# Patient Record
Sex: FEMALE | Race: BLACK OR AFRICAN AMERICAN | Employment: FULL TIME | URBAN - METROPOLITAN AREA
[De-identification: names, ages, dates, MRNs, and addresses within clinical notes are randomized per-mention and may not be internally consistent; named-entity substitution may affect disease eponyms.]

---

## 2017-01-28 ENCOUNTER — HOSPITAL ENCOUNTER (EMERGENCY)
Facility: HOSPITAL | Age: 42
Discharge: HOME | End: 2017-01-28

## 2017-01-28 ENCOUNTER — APPOINTMENT (OUTPATIENT)
Dept: GENERAL RADIOLOGY | Facility: HOSPITAL | Age: 42
End: 2017-01-28
Attending: NURSE PRACTITIONER

## 2017-01-28 VITALS
OXYGEN SATURATION: 98 % | BODY MASS INDEX: 33.13 KG/M2 | HEART RATE: 77 BPM | DIASTOLIC BLOOD PRESSURE: 77 MMHG | TEMPERATURE: 98 F | HEIGHT: 63 IN | RESPIRATION RATE: 18 BRPM | WEIGHT: 187 LBS | SYSTOLIC BLOOD PRESSURE: 142 MMHG

## 2017-01-28 DIAGNOSIS — J20.9 ACUTE BRONCHITIS, UNSPECIFIED ORGANISM: Primary | ICD-10-CM

## 2017-01-28 LAB — S PYO AG THROAT QL: NEGATIVE

## 2017-01-28 PROCEDURE — 87430 STREP A AG IA: CPT

## 2017-01-28 PROCEDURE — 71020 XR CHEST PA + LAT CHEST (CPT=71020): CPT

## 2017-01-28 PROCEDURE — 94640 AIRWAY INHALATION TREATMENT: CPT

## 2017-01-28 PROCEDURE — 99283 EMERGENCY DEPT VISIT LOW MDM: CPT

## 2017-01-28 RX ORDER — IPRATROPIUM BROMIDE AND ALBUTEROL SULFATE 2.5; .5 MG/3ML; MG/3ML
3 SOLUTION RESPIRATORY (INHALATION) ONCE
Status: COMPLETED | OUTPATIENT
Start: 2017-01-28 | End: 2017-01-28

## 2017-01-28 RX ORDER — FLUTICASONE PROPIONATE 50 MCG
1 SPRAY, SUSPENSION (ML) NASAL DAILY
COMMUNITY

## 2017-01-28 RX ORDER — ALBUTEROL SULFATE 90 UG/1
2 AEROSOL, METERED RESPIRATORY (INHALATION) EVERY 4 HOURS PRN
Qty: 1 INHALER | Refills: 0 | Status: SHIPPED | OUTPATIENT
Start: 2017-01-28 | End: 2017-02-27

## 2017-01-28 RX ORDER — CODEINE PHOSPHATE AND GUAIFENESIN 10; 100 MG/5ML; MG/5ML
10 SOLUTION ORAL EVERY 6 HOURS PRN
Qty: 200 ML | Refills: 0 | Status: SHIPPED | OUTPATIENT
Start: 2017-01-28

## 2017-01-28 NOTE — ED PROVIDER NOTES
Patient Seen in: [facility] Emergency Department    History   Patient presents with:  Cough    Stated Complaint:     HPI    Patient presents into the emergency room for evaluation of cough.   Patient states the onset of the symptoms began 2 days ag negative except as noted above. PSFH elements reviewed from today and agreed except as otherwise stated in HPI.     Physical Exam       ED Triage Vitals   BP 01/28/17 1207 140/86 mmHg   Pulse 01/28/17 1207 95   Resp 01/28/17 1207 22   Temp 01/28/17 1207 [address] [facility]    Schedule an appointment as soon as possible for a visit in 2 days        Medications Prescribed:  Current Discharge Medication List    START taking these medications    guaiFENesin-codeine (CHERATUSSIN AC) 100-10 MG/5ML Oral Solution

## 2017-09-18 ENCOUNTER — OFFICE VISIT (OUTPATIENT)
Dept: OTOLARYNGOLOGY | Facility: CLINIC | Age: 42
End: 2017-09-18

## 2017-09-18 VITALS
SYSTOLIC BLOOD PRESSURE: 138 MMHG | TEMPERATURE: 98 F | HEIGHT: 64 IN | BODY MASS INDEX: 32.44 KG/M2 | WEIGHT: 190 LBS | DIASTOLIC BLOOD PRESSURE: 84 MMHG

## 2017-09-18 DIAGNOSIS — J32.9 CHRONIC SINUSITIS, UNSPECIFIED LOCATION: Primary | ICD-10-CM

## 2017-09-18 PROCEDURE — 99203 OFFICE O/P NEW LOW 30 MIN: CPT | Performed by: OTOLARYNGOLOGY

## 2017-09-18 PROCEDURE — 99212 OFFICE O/P EST SF 10 MIN: CPT | Performed by: OTOLARYNGOLOGY

## 2017-09-18 RX ORDER — AZELASTINE 1 MG/ML
2 SPRAY, METERED NASAL 2 TIMES DAILY
Qty: 1 BOTTLE | Refills: 0 | Status: SHIPPED | OUTPATIENT
Start: 2017-09-18

## 2017-09-18 RX ORDER — MONTELUKAST SODIUM 10 MG/1
10 TABLET ORAL NIGHTLY
Qty: 30 TABLET | Refills: 3 | Status: SHIPPED | OUTPATIENT
Start: 2017-09-18

## 2017-09-19 NOTE — PROGRESS NOTES
Yana Barnett is a 43year old female.   Patient presents with:  Sinus Problem: recurrent sinus infection 5x in a year  Ear Problem: recurrent ear infection 3 x a year      HISTORY OF PRESENT ILLNESS  She presents with a very long history of chronic EXAM    /84   Temp 98.4 °F (36.9 °C) (Tympanic)   Ht 5' 4\" (1.626 m)   Wt 190 lb (86.2 kg)   BMI 32.61 kg/m²        Constitutional Normal Overall appearance overweight   Psychiatric Normal Orientation - Oriented to time, place, person & situation.  A by mouth every 6 (six) hours as needed for cough. , Disp: 200 mL, Rfl: 0  ASSESSMENT AND PLAN    1. Chronic sinusitis, unspecified location  Very congested nasal mucosa. Start Claritin-D, Singulair and Astelin nasal spray.   Continue fluticasone if possible

## 2019-05-11 ENCOUNTER — HOSPITAL ENCOUNTER (EMERGENCY)
Facility: HOSPITAL | Age: 44
Discharge: HOME | End: 2019-05-12
Attending: EMERGENCY MEDICINE

## 2019-05-11 DIAGNOSIS — N20.0 NEPHROLITHIASIS: Primary | ICD-10-CM

## 2019-05-11 PROCEDURE — 83735 ASSAY OF MAGNESIUM: CPT | Performed by: EMERGENCY MEDICINE

## 2019-05-11 PROCEDURE — 84703 CHORIONIC GONADOTROPIN ASSAY: CPT | Performed by: EMERGENCY MEDICINE

## 2019-05-11 PROCEDURE — 83690 ASSAY OF LIPASE: CPT | Performed by: EMERGENCY MEDICINE

## 2019-05-11 PROCEDURE — 80048 BASIC METABOLIC PNL TOTAL CA: CPT | Performed by: EMERGENCY MEDICINE

## 2019-05-11 PROCEDURE — 85025 COMPLETE CBC W/AUTO DIFF WBC: CPT | Performed by: EMERGENCY MEDICINE

## 2019-05-11 PROCEDURE — 96372 THER/PROPH/DIAG INJ SC/IM: CPT

## 2019-05-11 PROCEDURE — 96361 HYDRATE IV INFUSION ADD-ON: CPT

## 2019-05-11 PROCEDURE — 96374 THER/PROPH/DIAG INJ IV PUSH: CPT

## 2019-05-11 PROCEDURE — 96375 TX/PRO/DX INJ NEW DRUG ADDON: CPT

## 2019-05-11 PROCEDURE — 80076 HEPATIC FUNCTION PANEL: CPT | Performed by: EMERGENCY MEDICINE

## 2019-05-11 PROCEDURE — 99284 EMERGENCY DEPT VISIT MOD MDM: CPT

## 2019-05-11 RX ORDER — METOCLOPRAMIDE HYDROCHLORIDE 5 MG/ML
10 INJECTION INTRAMUSCULAR; INTRAVENOUS ONCE
Status: COMPLETED | OUTPATIENT
Start: 2019-05-11 | End: 2019-05-11

## 2019-05-11 RX ORDER — DICYCLOMINE HYDROCHLORIDE 10 MG/ML
20 INJECTION INTRAMUSCULAR ONCE
Status: COMPLETED | OUTPATIENT
Start: 2019-05-11 | End: 2019-05-11

## 2019-05-11 RX ORDER — DIPHENHYDRAMINE HYDROCHLORIDE 50 MG/ML
25 INJECTION INTRAMUSCULAR; INTRAVENOUS ONCE
Status: COMPLETED | OUTPATIENT
Start: 2019-05-11 | End: 2019-05-11

## 2019-05-12 ENCOUNTER — APPOINTMENT (OUTPATIENT)
Dept: CT IMAGING | Facility: HOSPITAL | Age: 44
End: 2019-05-12
Attending: EMERGENCY MEDICINE

## 2019-05-12 VITALS
OXYGEN SATURATION: 99 % | SYSTOLIC BLOOD PRESSURE: 145 MMHG | TEMPERATURE: 99 F | HEART RATE: 98 BPM | RESPIRATION RATE: 18 BRPM | WEIGHT: 200 LBS | DIASTOLIC BLOOD PRESSURE: 86 MMHG | HEIGHT: 63 IN | BODY MASS INDEX: 35.44 KG/M2

## 2019-05-12 PROCEDURE — 74177 CT ABD & PELVIS W/CONTRAST: CPT | Performed by: EMERGENCY MEDICINE

## 2019-05-12 PROCEDURE — 80307 DRUG TEST PRSMV CHEM ANLYZR: CPT | Performed by: EMERGENCY MEDICINE

## 2019-05-12 PROCEDURE — 81001 URINALYSIS AUTO W/SCOPE: CPT | Performed by: EMERGENCY MEDICINE

## 2019-05-12 RX ORDER — ONDANSETRON 2 MG/ML
4 INJECTION INTRAMUSCULAR; INTRAVENOUS ONCE
Status: COMPLETED | OUTPATIENT
Start: 2019-05-12 | End: 2019-05-12

## 2019-05-12 RX ORDER — ONDANSETRON 4 MG/1
4 TABLET, ORALLY DISINTEGRATING ORAL EVERY 8 HOURS PRN
Qty: 15 TABLET | Refills: 0 | Status: SHIPPED | OUTPATIENT
Start: 2019-05-12 | End: 2019-05-17

## 2019-05-12 RX ORDER — TAMSULOSIN HYDROCHLORIDE 0.4 MG/1
0.4 CAPSULE ORAL DAILY
Qty: 14 CAPSULE | Refills: 0 | Status: SHIPPED | OUTPATIENT
Start: 2019-05-12 | End: 2019-05-26

## 2019-05-12 RX ORDER — ALFUZOSIN HYDROCHLORIDE 10 MG/1
10 TABLET, EXTENDED RELEASE ORAL ONCE
Status: COMPLETED | OUTPATIENT
Start: 2019-05-12 | End: 2019-05-12

## 2019-05-12 RX ORDER — TRAMADOL HYDROCHLORIDE 50 MG/1
50 TABLET ORAL EVERY 8 HOURS PRN
Qty: 15 TABLET | Refills: 0 | Status: SHIPPED | OUTPATIENT
Start: 2019-05-12 | End: 2019-05-17

## 2019-05-12 RX ORDER — KETOROLAC TROMETHAMINE 30 MG/ML
30 INJECTION, SOLUTION INTRAMUSCULAR; INTRAVENOUS ONCE
Status: COMPLETED | OUTPATIENT
Start: 2019-05-12 | End: 2019-05-12

## 2019-05-12 RX ORDER — MELOXICAM 7.5 MG/1
7.5 TABLET ORAL DAILY
Qty: 14 TABLET | Refills: 0 | Status: SHIPPED | OUTPATIENT
Start: 2019-05-12 | End: 2019-05-26

## 2019-05-12 RX ORDER — POTASSIUM CHLORIDE 20 MEQ/1
40 TABLET, EXTENDED RELEASE ORAL ONCE
Status: COMPLETED | OUTPATIENT
Start: 2019-05-12 | End: 2019-05-12

## 2019-05-12 NOTE — ED PROVIDER NOTES
Patient Seen in: [facility] AND [facility] Emergency Department    History   Patient presents with:  Abdomen/Flank Pain (GI/)    Stated Complaint:  abd pain    HPI    39 yo F with PMH fibromyalgia presenting for evaluation of diffuse right sided abdominal pain a Smokeless tobacco: Never Used    Alcohol use: Yes      Comment: socially    Drug use: No      Review of Systems :  Constitutional: As per HPI  Gastrointestinal: (+) abd pain, (+) nausea/vomiting/diarrhea. Genitourinary: Negative for dysuria and hematuria. Status                     ---------                               -----------         ------                     CBC W/ DIFFERENTIAL[[id number]]          Abnormal            Final result                 Please view results for these tests on the individual destroy any copies or printouts. MDM     DIFFERENTIAL DIAGNOSIS: After history and physical exam differential diagnosis includes but is not limited to appendicitis, biliary colic/cholecystitis, gastroenteritis, nephrolithiasis.     Pulse ox: 99%:Normal o pain. Use caution when taking this medication, it may make you drowsy/dizzy. ).   Qty: 15 tablet Refills: 0

## 2022-05-14 ENCOUNTER — HOSPITAL ENCOUNTER (EMERGENCY)
Facility: HOSPITAL | Age: 47
Discharge: HOME | End: 2022-05-14
Payer: COMMERCIAL

## 2022-05-14 VITALS
HEART RATE: 81 BPM | BODY MASS INDEX: 35 KG/M2 | WEIGHT: 199.94 LBS | SYSTOLIC BLOOD PRESSURE: 175 MMHG | TEMPERATURE: 98 F | DIASTOLIC BLOOD PRESSURE: 100 MMHG | RESPIRATION RATE: 16 BRPM | OXYGEN SATURATION: 99 %

## 2022-05-14 DIAGNOSIS — T78.40XA ALLERGIC REACTION, INITIAL ENCOUNTER: Primary | ICD-10-CM

## 2022-05-14 PROCEDURE — 96361 HYDRATE IV INFUSION ADD-ON: CPT

## 2022-05-14 PROCEDURE — S0028 INJECTION, FAMOTIDINE, 20 MG: HCPCS | Performed by: NURSE PRACTITIONER

## 2022-05-14 PROCEDURE — 96374 THER/PROPH/DIAG INJ IV PUSH: CPT

## 2022-05-14 PROCEDURE — 99284 EMERGENCY DEPT VISIT MOD MDM: CPT

## 2022-05-14 PROCEDURE — 96375 TX/PRO/DX INJ NEW DRUG ADDON: CPT

## 2022-05-14 RX ORDER — DIPHENHYDRAMINE HCL 25 MG
25 CAPSULE ORAL EVERY 6 HOURS PRN
Qty: 10 CAPSULE | Refills: 0 | Status: SHIPPED | OUTPATIENT
Start: 2022-05-14 | End: 2022-05-17

## 2022-05-14 RX ORDER — EPINEPHRINE 0.3 MG/.3ML
0.3 INJECTION SUBCUTANEOUS
Qty: 1 EACH | Refills: 0 | Status: SHIPPED | OUTPATIENT
Start: 2022-05-14 | End: 2022-06-13

## 2022-05-14 RX ORDER — FAMOTIDINE 10 MG/ML
20 INJECTION, SOLUTION INTRAVENOUS ONCE
Status: COMPLETED | OUTPATIENT
Start: 2022-05-14 | End: 2022-05-14

## 2022-05-14 RX ORDER — FAMOTIDINE 20 MG/1
20 TABLET, FILM COATED ORAL 2 TIMES DAILY
Qty: 6 TABLET | Refills: 0 | Status: SHIPPED | OUTPATIENT
Start: 2022-05-14 | End: 2022-05-17

## 2022-05-14 RX ORDER — METHYLPREDNISOLONE SODIUM SUCCINATE 125 MG/2ML
125 INJECTION, POWDER, LYOPHILIZED, FOR SOLUTION INTRAMUSCULAR; INTRAVENOUS ONCE
Status: COMPLETED | OUTPATIENT
Start: 2022-05-14 | End: 2022-05-14

## 2022-05-14 RX ORDER — DIPHENHYDRAMINE HYDROCHLORIDE 50 MG/ML
25 INJECTION INTRAMUSCULAR; INTRAVENOUS ONCE
Status: COMPLETED | OUTPATIENT
Start: 2022-05-14 | End: 2022-05-14

## 2022-05-14 RX ORDER — PREDNISONE 20 MG/1
40 TABLET ORAL DAILY
Qty: 10 TABLET | Refills: 0 | Status: SHIPPED | OUTPATIENT
Start: 2022-05-14 | End: 2022-05-19

## 2022-05-14 NOTE — ED INITIAL ASSESSMENT (HPI)
The patient arrived reporting that she was stung by an insect 30 minutes prior to arrival with notable swelling to the right eyelid. She denies any allergies.

## 2022-05-14 NOTE — ED QUICK NOTES
Patient states that swelling and pain to right eye has decreased. Patient states her vision is no longer blurry and she is now able to open her right eye.

## 2022-12-25 ENCOUNTER — HOSPITAL ENCOUNTER (EMERGENCY)
Facility: HOSPITAL | Age: 47
Discharge: HOME | End: 2022-12-25
Attending: EMERGENCY MEDICINE
Payer: COMMERCIAL

## 2022-12-25 ENCOUNTER — APPOINTMENT (OUTPATIENT)
Dept: GENERAL RADIOLOGY | Facility: HOSPITAL | Age: 47
End: 2022-12-25
Attending: EMERGENCY MEDICINE
Payer: COMMERCIAL

## 2022-12-25 VITALS
RESPIRATION RATE: 18 BRPM | SYSTOLIC BLOOD PRESSURE: 148 MMHG | OXYGEN SATURATION: 96 % | WEIGHT: 214 LBS | HEIGHT: 63 IN | BODY MASS INDEX: 37.92 KG/M2 | DIASTOLIC BLOOD PRESSURE: 84 MMHG | HEART RATE: 82 BPM | TEMPERATURE: 99 F

## 2022-12-25 DIAGNOSIS — M79.5 RESIDUAL FOREIGN BODY IN SOFT TISSUE: Primary | ICD-10-CM

## 2022-12-25 PROCEDURE — 73140 X-RAY EXAM OF FINGER(S): CPT | Performed by: EMERGENCY MEDICINE

## 2022-12-25 PROCEDURE — 10120 INC&RMVL FB SUBQ TISS SMPL: CPT

## 2022-12-25 PROCEDURE — 99284 EMERGENCY DEPT VISIT MOD MDM: CPT

## 2022-12-25 PROCEDURE — 90471 IMMUNIZATION ADMIN: CPT

## 2022-12-25 RX ORDER — LIDOCAINE HYDROCHLORIDE 10 MG/ML
20 INJECTION, SOLUTION EPIDURAL; INFILTRATION; INTRACAUDAL; PERINEURAL ONCE
Status: COMPLETED | OUTPATIENT
Start: 2022-12-25 | End: 2022-12-25

## 2022-12-25 RX ORDER — CEPHALEXIN 500 MG/1
500 CAPSULE ORAL 3 TIMES DAILY
Qty: 21 CAPSULE | Refills: 0 | Status: SHIPPED | OUTPATIENT
Start: 2022-12-25 | End: 2023-01-01

## 2022-12-25 NOTE — DISCHARGE INSTRUCTIONS
Follow with hand specialist as an outpatient. Return to emergency room for any fevers of 100.4, redness around laceration site, pus from laceration site. The Emergency Department is not intended to be a substitute for an effort to provide complete medical care. The imaging, if any, have often been interpreted on a preliminary basis pending final reading by the radiologist. If your blood pressure was greater than 140/90, please have this blood pressure rechecked by your primary care provider in the next several days.

## 2022-12-25 NOTE — ED QUICK NOTES
Pt dcd to home aox3, ambulatory, discharge instruction given and voices understanding, prescription sent to preferred pharmacy , denies any concern

## 2022-12-25 NOTE — ED INITIAL ASSESSMENT (HPI)
Pt was using embroidery machine today and needle stabbed through right index finger. Needle is still int pt's finger.

## 2025-05-12 ENCOUNTER — HOSPITAL ENCOUNTER (OUTPATIENT)
Dept: MAMMOGRAPHY | Age: 50
End: 2025-05-12
Payer: MEDICAID

## 2025-05-12 ENCOUNTER — HOSPITAL ENCOUNTER (OUTPATIENT)
Dept: MAMMOGRAPHY | Age: 50
Discharge: HOME | End: 2025-05-12
Payer: MEDICAID

## 2025-05-12 DIAGNOSIS — Z12.31 ENCOUNTER FOR SCREENING MAMMOGRAM FOR MALIGNANT NEOPLASM OF BREAST: ICD-10-CM

## 2025-05-12 PROCEDURE — 77067 SCR MAMMO BI INCL CAD: CPT

## 2025-05-12 PROCEDURE — 77063 BREAST TOMOSYNTHESIS BI: CPT

## 2025-05-15 ENCOUNTER — OFFICE VISIT (OUTPATIENT)
Age: 50
End: 2025-05-15
Payer: MEDICAID

## 2025-05-15 ENCOUNTER — HOSPITAL ENCOUNTER (OUTPATIENT)
Dept: GENERAL RADIOLOGY | Facility: HOSPITAL | Age: 50
Discharge: HOME | End: 2025-05-15
Attending: STUDENT IN AN ORGANIZED HEALTH CARE EDUCATION/TRAINING PROGRAM
Payer: MEDICAID

## 2025-05-15 VITALS — DIASTOLIC BLOOD PRESSURE: 81 MMHG | HEART RATE: 89 BPM | SYSTOLIC BLOOD PRESSURE: 132 MMHG

## 2025-05-15 DIAGNOSIS — R52 PAIN: ICD-10-CM

## 2025-05-15 DIAGNOSIS — R52 PAIN: Primary | ICD-10-CM

## 2025-05-15 DIAGNOSIS — M75.111 NONTRAUMATIC INCOMPLETE TEAR OF RIGHT ROTATOR CUFF: ICD-10-CM

## 2025-05-15 PROCEDURE — 20610 DRAIN/INJ JOINT/BURSA W/O US: CPT | Performed by: STUDENT IN AN ORGANIZED HEALTH CARE EDUCATION/TRAINING PROGRAM

## 2025-05-15 PROCEDURE — 99204 OFFICE O/P NEW MOD 45 MIN: CPT | Performed by: STUDENT IN AN ORGANIZED HEALTH CARE EDUCATION/TRAINING PROGRAM

## 2025-05-15 PROCEDURE — 73030 X-RAY EXAM OF SHOULDER: CPT | Performed by: STUDENT IN AN ORGANIZED HEALTH CARE EDUCATION/TRAINING PROGRAM

## 2025-05-15 RX ORDER — KETOROLAC TROMETHAMINE 30 MG/ML
30 INJECTION, SOLUTION INTRAMUSCULAR; INTRAVENOUS ONCE
Status: COMPLETED | OUTPATIENT
Start: 2025-05-15 | End: 2025-05-15

## 2025-05-15 RX ORDER — METHYLPREDNISOLONE ACETATE 40 MG/ML
40 INJECTION, SUSPENSION INTRA-ARTICULAR; INTRALESIONAL; INTRAMUSCULAR; SOFT TISSUE ONCE
Status: COMPLETED | OUTPATIENT
Start: 2025-05-15 | End: 2025-05-15

## 2025-05-15 RX ADMIN — KETOROLAC TROMETHAMINE 30 MG: 30 INJECTION, SOLUTION INTRAMUSCULAR; INTRAVENOUS at 17:37:00

## 2025-05-15 RX ADMIN — METHYLPREDNISOLONE ACETATE 40 MG: 40 INJECTION, SUSPENSION INTRA-ARTICULAR; INTRALESIONAL; INTRAMUSCULAR; SOFT TISSUE at 17:38:00

## 2025-05-15 NOTE — PROGRESS NOTES
Per  request was draw 1 syringe with 2 ml Lidocaine 2% ; 2 ml Marcaine 0.5% ; 1 ml Toradol ( Ketorolac) 60 mg/2 ml , and 1 ml Depo-Medrol 40 mg for right  shoulder. Rupal HESTER MA

## 2025-05-19 NOTE — PROGRESS NOTES
[facility] - ORTHOPEDICS  [address] [facility] [address]  [phone]     NURSING INTAKE COMMENTS:   Chief Complaint   Patient presents with    Shoulder Pain     Right shoulder - Pt pain for a year, has been getting worse. Constant pain. Burning, numbness and tinging down arm No injury.             The following individual(s) verbally consented to be recorded using ambient AI listening technology and understand that they can each withdraw their consent to this listening technology at any point by asking the clinician to turn off or pause the recording:    Patient name: Herson Lewis        HPI:   History of Present Illness  Herson Ingram is a 50 year old female with fibromyalgia who presents with right shoulder pain.    She has been experiencing right shoulder pain for over two years, with a notable increase in frequency and intensity since the fall of last year. The pain is described as 'hot' and burning, with sensations of stretching and sharpness. It radiates from the shoulder down the arm, and she notes that it feels as if something is 'trying to rip apart' during certain movements.    Her history of fibromyalgia led her to initially attribute the shoulder pain to this condition, causing a delay in seeking treatment. She has been purchasing items to manage the shoulder pain for over two years, indicating a long-standing issue.    The pain has significantly impacted her daily life, as she is unable to perform activities after a recent doctor's appointment due to exacerbation of the pain. She also reports compensatory pain in her left wrist and side due to overuse from avoiding the right shoulder.    During the review of symptoms, the pain is exacerbated by certain movements and is accompanied by weakness in the shoulder. No pain is experienced when keeping the left arm up, but sharp pain occurs when keeping the right arm up.        Past Medical History[1]  Past Surgical History[2]  Current  Medications[3]  Allergies[4]  Family History[5]    Social History     Occupational History    Not on file   Tobacco Use    Smoking status: Never    Smokeless tobacco: Never   Vaping Use    Vaping status: Never Used   Substance and Sexual Activity    Alcohol use: Yes     Comment: socially    Drug use: No    Sexual activity: Not on file        Review of Systems:  GENERAL: denies fevers, chills, night sweats, fatigue, unintentional weight loss/gain  SKIN: denies skin lesions, open sores, rash  HEENT:denies recent vision change, new nasal congestion,hearing loss, tinnitus, sore throat, headaches  RESPIRATORY: denies new shortness of breath, cough, asthma, wheezing  CARDIOVASCULAR: denies chest pain, leg cramps with exertion, palpitations, leg swelling  GI: denies abdominal pain, nausea, vomiting, diarrhea, constipation, hematochezia, worsening heartburn or stomach ulcers  : denies dysuria, hematuria, incontinence, increased frequency, urgency, difficulty urinating  MUSCULOSKELETAL: denies musculoskeletal complaints other than in HPI  NEURO: denies numbness, tingling, weakness, balance issues, dizziness, memory loss  PSYCHIATRIC: denies Hx of depression, anxiety, other psychiatric disorders  HEMATOLOGIC: denies blood clots, anemia, blood clotting disorders, blood transfusion  ENDOCRINE: denies autoimmune disease, thyroid issues, or diabetes  ALLERGY: denies asthma, seasonal allergies    Physical Examination:    /81   Pulse 89   LMP 05/06/2019     Physical Exam  MUSCULOSKELETAL: Right shoulder pain with stretching sensation, weakness on abduction, and sharp pain on resistance.    Constitutional: appears well hydrated, alert and responsive, no acute distress noted          Imaging:     Results  RADIOLOGY  Shoulder X-ray: Patient reported pain during imaging (05/14/2025)      Imaging was independently reviewed and interpreted by attending physician  FAMILIA RUBIO 2D+3D SCREENING BILAT (CPT=77067/93453)  Result Date:  5/13/2025  PROCEDURE: [facility] RAMIRO 2D+3D SCREENING BILAT (87472/57747)  COMPARISON: None.  INDICATIONS: Z12.31 Encounter for screening mammogram for malignant neoplasm of breast  TECHNIQUE:  Full field direct screening mammography was performed and images were reviewed with the depict RICA 1.5.1.5 CAD device.  3D tomosynthesis was performed and reviewed   BREAST COMPOSITION:   Category B - There are scattered areas of fibroglandular density.   FINDINGS:   Ovoid asymmetry in the central anterior subareolar left breast. There is no suspicious mass, architectural distortion, or calcifications identified in the right breast.           CONCLUSION:     Patient reports previous films at an outside institution.  If previous films are obtained, an addendum can be submitted. Otherwise, Additional diagnostic mammographic views with possible ultrasound recommended for left anterior asymmetry    BI-RADS CATEGORY:   DIAGNOSTIC CATEGORY 0 - INCOMPLETE: NEED PRIOR MAMMOGRAMS FOR COMPARISON   RECOMMENDATIONS:  COMPARISON TO PRIOR EXAMS REQUIRED. --An addendum report will be sent after prior exams are obtained and reviewed.       PLEASE NOTE: NORMAL MAMMOGRAM DOES NOT EXCLUDE THE POSSIBILITY OF BREAST CANCER.  A CLINICALLY SUSPICIOUS PALPABLE LUMP SHOULD BE BIOPSIED.   For patients over the age of 40, the target due date for the patient's next mammogram has been entered into a reminder system.   Patient received a discharge summary from the technologist after completion of exam.  Breast marker legend used on images  Triangle = Palpable lump Iliff = Skin tag or mole BB = Nipple Linear dorian = Scar Square = Pain    Dictated by (CST): Anabel Herrera MD on 5/13/2025 at 3:00 PM     Finalized by (CST): Anabel Herrera MD on 5/13/2025 at 3:02 PM      [address], [address], [place] [address] [phone]       Labs:  Lab Results   Component Value Date    WBC 16.6 (H) 05/11/2019    HGB 11.2 (L)  05/11/2019    .0 05/11/2019      Lab Results   Component Value Date     (H) 05/11/2019    BUN 16 05/11/2019    CREATSERUM 1.12 (H) 05/11/2019    GFRNAA 60 05/11/2019    GFRAA 69 05/11/2019        Assessment and Plan:  Assessment & Plan  Rotator cuff injury  Chronic right shoulder pain with suspected rotator cuff injury. Examination suggests weakness and pain consistent with rotator cuff injury. Differential includes full or partial tear. Informed consent obtained for cortisone injection. Two-thirds of patients with high-grade partial tears improve with physical therapy and injection.  - Administer low-dose cortisone steroid injection to right shoulder.  - Order MRI of right shoulder to assess extent of injury.  - Recommend physical therapy for shoulder function and pain management.  - Schedule follow-up in six weeks to assess treatment response and review MRI results.    Fibromyalgia  Chronic condition contributing to overall pain experience. High pain tolerance may have delayed treatment for shoulder pain.    Procedure Note Corticosteroid Injection  _______________________________________________________________________________________________________________  Patient Name: Herson eLwis   Date: 5/19/2025     Based on history, physical exam, and available diagnostic testing, the patient diagnosis appears consistent with intraarticular pathology. We discussed the use of a corticosteroid injection for therapeutic/confirmatory diagnostic purposes. The risks, benefits, and potential complications of corticosteroid injection(s) were discussed in detail. The risks include, but are not limited to: pain, infection, bleeding/hematoma, swelling, flare response, incomplete resolution of symptoms, possible need for further injections or subsequent surgical intervention, subcutaneous fat atrophy, skin pigmentation changes, and elevation of blood sugar. The patient verbalized an understanding and agrees  to the injection(s).     Under sterile prep, approximately:  1 mL DepoMedrol 40mg/ml, 2 mL marcaine plain, 2 mL lidocaine plain, 1mL Toradol 30mg/mL    was injected into: Right shoulder joint subacromial space    This procedure was performed without ultrasound guidance    This procedure was well tolerated. The patient understands that the injection could take several days to take effect.    The patient was not sedated and fully conscious for the injection.  Prior to the injection, verification followed the Universal Protocol in the following manner:  [X] A \"Time Out\" was performed prior to the procedure to confirm patient identification, injection site, and preparation of equipment.  [X] The patient was identified using two patient identifiers.  [X] The procedure site was appropriately prepped    Jasvir Benson MD  Orthopaedic Surgery  5/19/2025     Diagnoses and all orders for this visit:    Pain  -     XR SHOULDER, COMPLETE (MIN 2 VIEWS), RIGHT (CPT=73030); Future    Nontraumatic incomplete tear of right rotator cuff  -     MRI SHOULDER, RIGHT (CPT=73221); Future  -     Large joint - right aspiration/injection  -     ketorolac (Toradol) 60 MG/2ML IM injection 30 mg  -     methylPREDNISolone acetate (DEPO-medrol) 40 MG/ML injection 40 mg        Follow Up: No follow-ups on file.          Jasvir Benson MD Orthopedic Surgery / Sports Medicine Specialist  [facility]  [facility] [place] [place] [address]  [email or web address]    t: [phone] f: [phone]    This note was dictated using Dragon software.  While it was briefly proofread prior to completion, some grammatical, spelling, and word choice errors due to dictation may still occur.       [1]   Past Medical History:   Fibromyalgia   [2]   Past Surgical History:  Procedure Laterality Date    Knee replacement surgery  2016    Knee scope,full synovect Left 4/14/2016    Procedure: ARTHROSCOPY LEFT KNEE W/ MEDIAL MENISCECTOMY;  Surgeon:  Arsh Ugarte MD;  Location: [facility]    Knee scope,med/lat menisectomy Left 4/14/2016    Procedure: ARTHROSCOPY LEFT KNEE W/ MEDIAL MENISCECTOMY;  Surgeon: Arsh Ugarte MD;  Location: [facility]    Other Right     laparoscopic knee surgery   [3]   Current Outpatient Medications   Medication Sig Dispense Refill    Montelukast Sodium 10 MG Oral Tab Take 1 tablet (10 mg total) by mouth nightly. 30 tablet 3    Azelastine HCl 0.1 % Nasal Solution 2 sprays by Nasal route 2 (two) times daily. 1 Bottle 0    Loratadine-Pseudoephedrine ER 5-120 MG Oral Tablet 12 Hr Take 1 tablet by mouth every 12 (twelve) hours. 60 tablet 3    Fluticasone Propionate 50 MCG/ACT Nasal Suspension 1 spray by Each Nare route daily.      guaiFENesin-codeine (CHERATUSSIN AC) 100-10 MG/5ML Oral Solution Take 10 mL by mouth every 6 (six) hours as needed for cough. 200 mL 0    oxyCODONE-acetaminophen 5-325 MG Oral Tab Take 1-2 tablets by mouth every 4 (four) hours as needed for Pain (new 4/14/16).      aspirin 325 MG Oral Tab Take 325 mg by mouth daily.      Naproxen Sodium 220 MG Oral Cap Take 220 mg by mouth every 12 (twelve) hours as needed (advised for added pain relief as needed).      HYDROcodone-acetaminophen 5-325 MG Oral Tab Take 1 tablet by mouth every 6 (six) hours as needed for Pain.      Naproxen-Esomeprazole 500-20 MG Oral Tab EC Take by mouth.     [4] No Known Allergies  [5]   Family History  Problem Relation Age of Onset    Prostate Cancer Maternal Grandfather     Prostate Cancer Maternal Uncle

## 2025-06-09 ENCOUNTER — HOSPITAL ENCOUNTER (OUTPATIENT)
Dept: MRI IMAGING | Age: 50
Discharge: HOME | End: 2025-06-09
Attending: STUDENT IN AN ORGANIZED HEALTH CARE EDUCATION/TRAINING PROGRAM
Payer: MEDICAID

## 2025-06-09 DIAGNOSIS — M75.111 NONTRAUMATIC INCOMPLETE TEAR OF RIGHT ROTATOR CUFF: ICD-10-CM

## 2025-06-09 PROCEDURE — 73221 MRI JOINT UPR EXTREM W/O DYE: CPT | Performed by: STUDENT IN AN ORGANIZED HEALTH CARE EDUCATION/TRAINING PROGRAM

## 2025-06-30 ENCOUNTER — OFFICE VISIT (OUTPATIENT)
Dept: ORTHOPEDICS CLINIC | Facility: CLINIC | Age: 50
End: 2025-06-30
Payer: MEDICAID

## 2025-06-30 ENCOUNTER — OFFICE VISIT (OUTPATIENT)
Dept: PHYSICAL THERAPY | Age: 50
End: 2025-06-30
Attending: STUDENT IN AN ORGANIZED HEALTH CARE EDUCATION/TRAINING PROGRAM
Payer: MEDICAID

## 2025-06-30 DIAGNOSIS — M67.813 TENDINOSIS OF RIGHT ROTATOR CUFF: Primary | ICD-10-CM

## 2025-06-30 PROCEDURE — 97110 THERAPEUTIC EXERCISES: CPT | Performed by: PHYSICAL THERAPIST

## 2025-06-30 PROCEDURE — 97163 PT EVAL HIGH COMPLEX 45 MIN: CPT | Performed by: PHYSICAL THERAPIST

## 2025-06-30 PROCEDURE — 99214 OFFICE O/P EST MOD 30 MIN: CPT | Performed by: STUDENT IN AN ORGANIZED HEALTH CARE EDUCATION/TRAINING PROGRAM

## 2025-06-30 RX ORDER — AMLODIPINE BESYLATE 2.5 MG/1
TABLET ORAL
COMMUNITY
Start: 2024-09-30

## 2025-06-30 RX ORDER — CELECOXIB 200 MG/1
200 CAPSULE ORAL 2 TIMES DAILY
Qty: 60 CAPSULE | Refills: 0 | Status: SHIPPED | OUTPATIENT
Start: 2025-06-30

## 2025-06-30 NOTE — PROGRESS NOTES
UPPER EXTREMITY EVALUATION:     Diagnosis:   Tendinosis of right rotator cuff (M67.813) Patient:  Herson Lewis (50 year old, female)        Referring Provider: Jasvir Benson  Today's Date   6/30/2025    Precautions:  None   Date of Evaluation: 06/30/25  Next MD visit: No data recorded  Date of Surgery: NA     PATIENT SUMMARY     Summary of chief complaints: R shoulder pain  History of current condition: Has fibromyalgia so has body pain.  8 months ago R shoulder became more painful and frequent and continued to progress.  Had difficulty moving it in January.  No injury.  saw Dr. Roth 1 1/2 months ago.  Had tests done - U/S, MRI.  MD said no surgery.  Injection given 1 1/2 months ago and did help with the shoulder pain.   Pain level: current 4 /10, at best 4 /10, at worst 10 /10  Description of symptoms: burning front of shoulder some tingling.   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   Leisure activities/Hobbies: has minimized them due to pain.   Prior level of function: Prior to 8 months ago had a few flare ups and then went away, thought it was fibromylagia  Current limitations: lifting overhead, reaching behind, carrying anything > 5#, shoulder hasn't woke her up but wakes up due to fibromyalgia.  Pt goals: 1. To be more flexible and get strength back at the shoulder.  Hand Dominance: right   Past medical history was reviewed with Herson.  Significant findings include:    Imaging/Tests:   MRI  Herson  has a past medical history of Fibromyalgia.  She  has a past surgical history that includes knee scope,med/lat menisectomy (Left, 4/14/2016); knee scope,full synovect (Left, 4/14/2016); other (Right); and knee replacement surgery (2016).    ASSESSMENT  Herson presents to physical therapy evaluation with primary c/o R shoulder pain and limited ability to move the shoulder. The results of the objective tests and measures  show impaired ROM R to L shoulder, extreme sensitivity to light touch around shoulder, impaired strength R to L, tenderness bicep tendon region R to L/RC region R to L, (+) impingement and c/o pain greatly limiting ability to perform exericises/movements. . Functional deficits include but are not limited to lifting overhead, reaching behind, carrying anything > 5#, shoulder hasn't woke her up but wakes up due to fibromyalgia.. Signs and symptoms are consistent with diagnosis of Tendinosis of right rotator cuff (M67.813). Pt and PT discussed evaluation findings, pathology, POC and HEP.  Pt voiced understanding and performs HEP correctly without reported pain. Skilled Physical Therapy is medically necessary to address the above impairments and reach functional goals.    OBJECTIVE:      Musculoskeletal  Observation/Posture: forward head posture; scapula abduction; anterior pelvic tilt  Palpation: tenderness RC area, anterior shoulder/bicep tendon   Special Tests: (+) impingement     ROM and Strength (* denotes performed with pain)  Shoulder   ROM MMT (-/5)    R L R L     Flex 100 pain/ passive 135 pain 150 4 minus pain 5/5     Ext 25 45 4/5 5/5     Abd (C5) 120 pain end range, 128 passive with pain 170 4 minus pain 5/5     IR L1 with pain lower mid thoracic 4/5 5/5     ER 70 85 4/5 5/5     Low Trap n/a  NT  NT     Mid Trap n/a  4 minus  4/5     SA n/a                   Neurological:  Sensation: tingling in shoulder- comes and goes, sensation is normal, tingling in hands and feet off and on.      Today's Treatment and Response:   Pt education was provided on exam findings, treatment diagnosis, treatment plan, expectations, and prognosis.    Today's Treatment       6/30/2025   UE Treatment   Therapeutic Exercise Minutes 15   Evaluation Minutes 30   Total Time Of Timed Procedures 15   Total Time Of Service-Based Procedures 30   Total Treatment Time 45   HEP Table slides frwd/scaption  Scapular retractions   AROM ER/IR  movements  Rows YTB        Patient was instructed in and issued a HEP for: Table slides frwd/scaption  Scapular retractions   AROM ER/IR movements  Rows YTB- patient to try  was able to do 5 in department.    Charges:  PT EVAL: High Complexity,    In agreement with evaluation findings and clinical rationale, this evaluation involved HIGH COMPLEXITY decision making due to 3 or more personal factors/comorbidities, 4 or more body structures involved/activity limitations, and unstable symptoms as documented in the evaluation.                                                          PLAN OF CARE:      Goals: (to be met in 12 visits)   Patient to be within 5-10 deg of L shoulder with all movements with minimal pain.   Patient independent with ongoing HEP  Patient to report pain levels at 1-2/10 or less with movement of R UE  Patient to be able to reach into cabinets with minimal pain  Patient to be able to reach behind her for dressing with minimal pain     Frequency / Duration: Patient will be seen 2x/week or a total of 12  visits over a 90 day period. Treatment will include: Therapeutic Exercise; Home Exercise Program instruction; Manual Therapy, modalities as needed for pain relief.     Education or treatment limitation: None   Rehab Potential: good     QuickDASH Outcome Score  Score: (Patient-Rptd) 45.45 % (6/30/2025  5:00 PM)      Patient was advised of these findings, precautions, and treatment options and has agreed to actively participate in planning and for this course of care.    Thank you for your referral. Please co-sign or sign and return this letter via fax as soon as possible to [phone]. If you have any questions, please contact me at Dept: [phone]    Sincerely,  Electronically signed by therapist: Kary Naik, PT  Physician's certification required: Yes  I certify the need for these services furnished under this plan of treatment and while under my  care.    X___________________________________________________ Date____________________    Certification From: 6/30/2025  To: 9/28/2025           Post-Care Instructions: Patient instructed to not lie down for 4 hours and limit physical activity for 24 hours. Patient instructed not to travel by airplane for 48 hours.

## 2025-07-02 ENCOUNTER — OFFICE VISIT (OUTPATIENT)
Dept: PHYSICAL THERAPY | Age: 50
End: 2025-07-02
Attending: STUDENT IN AN ORGANIZED HEALTH CARE EDUCATION/TRAINING PROGRAM
Payer: MEDICAID

## 2025-07-02 PROCEDURE — 97110 THERAPEUTIC EXERCISES: CPT | Performed by: PHYSICAL THERAPIST

## 2025-07-02 PROCEDURE — 97014 ELECTRIC STIMULATION THERAPY: CPT | Performed by: PHYSICAL THERAPIST

## 2025-07-02 NOTE — PROGRESS NOTES
Patient: Herson Lewis (50 year old, female) Referring Provider:  Insurance:   Diagnosis: Tendinosis of right rotator cuff (M67.813) Jasvir [place] [employer]   Date of Surgery: NA Next MD visit:  N/A   Precautions:  None No data recorded Referral Information:    Date of Evaluation: Req: 10, Auth: 10, Exp: 8/29/2025 06/30/25 POC Auth Visits:  10       Today's Date   7/2/2025    Subjective  Patient reports able to do the exercises.       Pain: 4/10     Objective  see outpatient daily record.            Assessment  Patient continues to be very sensitive to touch and movements of R shoulder.  Was able to add 3 additional light exercises for home.  Used IFC and ice end of session.  Patient did report ice felt good as usually uses heat.  Patient to trial ice at home several times a day.    Goals (to be met in 12 visits)   Patient to be within 5-10 deg of L shoulder with all movements with minimal pain.   Patient independent with ongoing HEP  Patient to report pain levels at 1-2/10 or less with movement of R UE  Patient to be able to reach into cabinets with minimal pain  Patient to be able to reach behind her for dressing with minimal pain       Plan  Continue light motion to fascilitate better motion, advance as able.  Continue use of IFC and ice for pain relief.    Treatment Last 4 Visits  Treatment Day: 2       6/30/2025 7/2/2025   UE Treatment   Therapeutic Exercise  3 minutes nu step level 3  Supine - PROM flex/abd to just below 90, ER /IR  Side ER x 10  Prone - hang arm off edge of bed x 1 minute  Prone rows x 8  Scapular retractions x 10  Standing single arm row YTB x 10  IFC and ice x 15 minutes.      Therapeutic Exercise Minutes 15 30   Evaluation Minutes 30    E-Stim (Unattended) Minutes  15   Total Time Of Timed Procedures 15 30   Total Time Of Service-Based Procedures 30 15   Total Treatment Time 45 45   HEP Table slides frwd/scaption  Scapular retractions   AROM ER/IR  movements  Rows YTB         HEP  Table slides frwd/scaption  Scapular retractions   AROM ER/IR movements  Rows YTB single arm  Prone arm hang   Prone row  Side ER    Charges   Ex 2, IFC unattended

## 2025-07-08 ENCOUNTER — OFFICE VISIT (OUTPATIENT)
Dept: NEUROLOGY | Facility: CLINIC | Age: 50
End: 2025-07-08
Payer: MEDICAID

## 2025-07-08 VITALS — HEART RATE: 80 BPM | SYSTOLIC BLOOD PRESSURE: 131 MMHG | DIASTOLIC BLOOD PRESSURE: 85 MMHG | OXYGEN SATURATION: 99 %

## 2025-07-08 DIAGNOSIS — R20.2 PARESTHESIA OF BOTH HANDS: ICD-10-CM

## 2025-07-08 DIAGNOSIS — R20.2 PARESTHESIA OF BOTH LEGS: ICD-10-CM

## 2025-07-08 DIAGNOSIS — M79.7 FIBROMYALGIA: ICD-10-CM

## 2025-07-08 PROBLEM — R73.03 PREDIABETES: Status: ACTIVE | Noted: 2024-12-04

## 2025-07-08 PROBLEM — K21.00 GASTROESOPHAGEAL REFLUX DISEASE WITH ESOPHAGITIS: Status: ACTIVE | Noted: 2021-06-14

## 2025-07-08 PROBLEM — I10 HIGH BLOOD PRESSURE: Status: ACTIVE | Noted: 2025-07-08

## 2025-07-08 PROBLEM — J30.9 ALLERGIC RHINITIS: Status: ACTIVE | Noted: 2023-04-11

## 2025-07-08 PROBLEM — D50.9 IRON DEFICIENCY ANEMIA: Status: ACTIVE | Noted: 2021-06-14

## 2025-07-08 PROBLEM — B97.7 PAPILLOMAVIRUS AS THE CAUSE OF DISEASES CLASSIFIED ELSEWHERE: Status: ACTIVE | Noted: 2017-09-29

## 2025-07-08 PROBLEM — J32.9 CHRONIC SINUSITIS: Status: ACTIVE | Noted: 2023-04-11

## 2025-07-08 PROBLEM — R87.619 ABNORMAL CERVICAL PAPANICOLAOU SMEAR: Status: ACTIVE | Noted: 2025-07-08

## 2025-07-08 PROBLEM — I10 ESSENTIAL HYPERTENSION: Status: ACTIVE | Noted: 2021-07-23

## 2025-07-08 PROBLEM — D64.9 ANEMIA: Status: ACTIVE | Noted: 2023-04-11

## 2025-07-08 PROBLEM — M17.12 OSTEOARTHRITIS OF LEFT KNEE: Status: ACTIVE | Noted: 2019-09-17

## 2025-07-08 PROCEDURE — 99204 OFFICE O/P NEW MOD 45 MIN: CPT | Performed by: OTHER

## 2025-07-08 RX ORDER — CHOLECALCIFEROL (VITAMIN D3) 50 MCG
1 TABLET ORAL DAILY
COMMUNITY
Start: 2025-05-14

## 2025-07-08 RX ORDER — AMLODIPINE BESYLATE 10 MG/1
10 TABLET ORAL DAILY
COMMUNITY

## 2025-07-08 RX ORDER — ACETAMINOPHEN AND CODEINE PHOSPHATE 300; 15 MG/1; MG/1
1 TABLET ORAL
COMMUNITY
Start: 2022-12-28

## 2025-07-08 RX ORDER — AMITRIPTYLINE HYDROCHLORIDE 10 MG/1
10 TABLET ORAL NIGHTLY
COMMUNITY
Start: 2024-10-23

## 2025-07-08 RX ORDER — FAMOTIDINE 20 MG/1
1 TABLET, FILM COATED ORAL NIGHTLY
COMMUNITY

## 2025-07-08 RX ORDER — CETIRIZINE HYDROCHLORIDE 10 MG/1
10 TABLET ORAL DAILY
COMMUNITY
Start: 2019-12-11

## 2025-07-08 RX ORDER — HYDROCHLOROTHIAZIDE 25 MG/1
25 TABLET ORAL DAILY
COMMUNITY
Start: 2024-09-09

## 2025-07-08 RX ORDER — PREGABALIN 75 MG/1
1 CAPSULE ORAL 2 TIMES DAILY
COMMUNITY
End: 2025-07-08 | Stop reason: DRUGHIGH

## 2025-07-08 RX ORDER — AMLODIPINE BESYLATE 10 MG/1
10 TABLET ORAL DAILY
COMMUNITY
Start: 2024-09-09 | End: 2025-08-12

## 2025-07-08 RX ORDER — POLYETHYLENE GLYCOL 3350, SODIUM CHLORIDE, SODIUM BICARBONATE, POTASSIUM CHLORIDE 420; 11.2; 5.72; 1.48 G/4L; G/4L; G/4L; G/4L
4000 POWDER, FOR SOLUTION ORAL
COMMUNITY
Start: 2024-09-19

## 2025-07-08 RX ORDER — BUPROPION HYDROCHLORIDE 150 MG/1
150 TABLET ORAL DAILY
COMMUNITY

## 2025-07-08 RX ORDER — FERROUS SULFATE 325(65) MG
325 TABLET ORAL
COMMUNITY
Start: 2019-09-17

## 2025-07-08 RX ORDER — LOSARTAN POTASSIUM 25 MG/1
25 TABLET ORAL DAILY
COMMUNITY

## 2025-07-08 RX ORDER — PREGABALIN 50 MG/1
50 CAPSULE ORAL 2 TIMES DAILY
Qty: 60 CAPSULE | Refills: 0 | Status: SHIPPED | OUTPATIENT
Start: 2025-07-08

## 2025-07-08 RX ORDER — ROSUVASTATIN CALCIUM 5 MG/1
5 TABLET, COATED ORAL NIGHTLY
COMMUNITY

## 2025-07-08 RX ORDER — EPINEPHRINE 0.3 MG/.3ML
INJECTION SUBCUTANEOUS
COMMUNITY

## 2025-07-08 RX ORDER — MELOXICAM 5 MG/1
5 CAPSULE ORAL
COMMUNITY
Start: 2024-09-30

## 2025-07-08 RX ORDER — IBUPROFEN 800 MG/1
1 TABLET, FILM COATED ORAL EVERY 8 HOURS
COMMUNITY

## 2025-07-08 RX ORDER — HYDROCHLOROTHIAZIDE 25 MG/1
1 TABLET ORAL NIGHTLY
COMMUNITY

## 2025-07-08 RX ORDER — ERGOCALCIFEROL 1.25 MG/1
50000 CAPSULE, LIQUID FILLED ORAL WEEKLY
COMMUNITY

## 2025-07-08 RX ORDER — BUPROPION HYDROCHLORIDE 150 MG/1
1 TABLET ORAL NIGHTLY
COMMUNITY

## 2025-07-08 RX ORDER — CHLORTHALIDONE 25 MG/1
25 TABLET ORAL DAILY
COMMUNITY

## 2025-07-08 NOTE — PROGRESS NOTES
HPI:    Patient ID: Herson Lewis is a 50 year old female.    HPI  History of Present Illness  Herson Lewis is a 50 year old female with fibromyalgia who presents with worsening tingling and numbness in both hands and legs.    She has experienced tingling and numbness in both hands for many years, attributing these symptoms to her fibromyalgia. Recently, the symptoms have worsened, becoming sharp and waking her from sleep. Initially, the tingling was brief, akin to the sensation of 'unfreezing' from cold, but now it persists longer and is more intense, especially in the mornings.    The pain is described as sharp and needle-like, impairing her ability to perform tasks such as squeezing or holding objects, leading to frequent dropping of items. The symptoms have progressed over the years, initially affecting her legs before the pandemic, then her left arm, and now both hands and legs. She sometimes needs to sit for over an hour due to the sensitivity and pain.    She has a history of fibromyalgia diagnosed at age 16, for which she has tried various medications. Tramadol has been used for severe pain, but medications like amitriptyline and gabapentin caused undesirable side effects such as feeling 'loopy' or 'spaced out'. She has not tried duloxetine or pregabalin recently and is unsure about the current status of her prescriptions.    No diabetes, but she mentions being prediabetic. She has experienced joint pain but emphasizes that the sharp, needle-like pain is more concerning.          HISTORY:  Past Medical History[1]   Past Surgical History[2]   Family History[3]   Short Social Hx on File[4]     Review of Systems   Constitutional: Negative.    HENT: Negative.     Eyes: Negative.    Respiratory: Negative.     Cardiovascular: Negative.    Gastrointestinal: Negative.    Endocrine: Negative.    Genitourinary: Negative.    Musculoskeletal: Negative.    Allergic/Immunologic:  Negative.    Neurological:  Positive for numbness.   Hematological: Negative.    Psychiatric/Behavioral: Negative.     All other systems reviewed and are negative.         Current Medications[5]  Allergies:Allergies[6]  PHYSICAL EXAM:   Physical Exam  Blood pressure 131/85, pulse 80, last menstrual period 05/06/2019, SpO2 99%.  General Appearance: Well nourished, well developed, no apparent distress.     HEENT: Normocephalic and atraumatic. Normal sclera. Moist mucus membrane  Neck: Normal range of motion. Neck supple.  Cardiovascular: Normal rate, regular rhythm and normal heart sounds.    Pulmonary/Chest: Effort normal and breath sounds normal.   Abdominal: Soft. Bowel sounds are normal.   Skin: dry, clean and intact  Ext: peripheral pulses present  Psych: normal mood and affect    Neurological:  Patient is awake, alert and oriented to person, place and time   Normal memory, attention/concentration, speech and language.    Cranial Nerves: II: Visual acuity: normal  II: Visual fields: normal  III: Pupils: equal, round, reactive to light  III,IV,VI: Extra Ocular Movements: intact  V: Facial sensation: intact  VII: Facial strength: intact  VIII: Hearing: intact  IX: Palate: intact  XI: Shoulder shrug: intact  XII: Tongue movement: normal    Motor: Normal tone. Strength is  5 out of 5 in all extremities bilaterally.  DTR: present 2+ throughout    Sensory: Sensory examination is normal to light touch and pinprick with patchy hyperesthesia in both legs and arms.    Coordination: Finger-to-nose, heel-to-shin, and rapid alternating movements are normal bilaterally without evidence of dysmetria.    Gait: normal casual gait        TESTS/IMAGING:       ESR-Westergren  <=27 mm/hr 14       C Reactive Protein  <=5.0 mg/L 5.6 High        JOSE Screen  Negative Negative       Creatine Phosphokinase  10.0 - 205.0 U/L 123     Ref Range & Units 10/23/24  2:44 PM   Vitamin B12  210 - 920 pg/mL 699       ASSESSMENT/PLAN:     Encounter  Diagnoses   Name Primary?    Paresthesia of both legs     Paresthesia of both hands     Fibromyalgia      Assessment & Plan  Paresthesia of both hands and legs  Suspected peripheral neuropathy with severe pain and tingling in hands and feet.  Differential includes carpal tunnel syndrome.   - Recent lab work shows prediabetes  -Order EMG to assess for neuropathy or carpal tunnel syndrome.      Fibromyalgia  Chronic fibromyalgia with worsening tingling and numbness in hands and legs, causing discomfort and impairment. Previous treatments not tolerated.  - Pregabalin for symptom control. Start with 50 mg BID as tolerated  - Follow with Rheumatology      No orders of the defined types were placed in this encounter.      Thank you for allowing us to participate in your patient's care.         Radha Damon MD   [facility] [place]      This note was prepared using Dragon Medical voice recognition dictation software. As a result errors may occur. When identified these errors have been corrected. While every attempt is made to correct errors during dictation discrepancies may still exist           Meds This Visit:  Requested Prescriptions     Signed Prescriptions Disp Refills    pregabalin 50 MG Oral Cap 60 capsule 0     Sig: Take 1 capsule (50 mg total) by mouth 2 (two) times daily.       Imaging & Referrals:  None     ID#[id number]         [1]   Past Medical History:   Essential hypertension    Last Assessment & Plan:    Patient needs to make an appointment with PCP for management of htn.    Fibromyalgia    Hyperlipidemia   [2]   Past Surgical History:  Procedure Laterality Date    Knee replacement surgery  2016    Knee scope,full synovect Left 4/14/2016    Procedure: ARTHROSCOPY LEFT KNEE W/ MEDIAL MENISCECTOMY;  Surgeon: Arsh Ugarte MD;  Location: [facility]    Knee scope,med/lat menisectomy Left 4/14/2016    Procedure: ARTHROSCOPY LEFT KNEE W/ MEDIAL MENISCECTOMY;  Surgeon: Arsh Ugarte MD;  Location:  [facility]    Other Right     laparoscopic knee surgery   [3]   Family History  Problem Relation Age of Onset    Prostate Cancer Maternal Grandfather     Prostate Cancer Maternal Uncle    [4]   Social History  Socioeconomic History    Marital status:    Tobacco Use    Smoking status: Never    Smokeless tobacco: Never   Vaping Use    Vaping status: Never Used   Substance and Sexual Activity    Alcohol use: Yes     Comment: socially    Drug use: No   Social History Narrative    ** Merged History Encounter **          Social Drivers of Health     Food Insecurity: No Food Insecurity (5/14/2025)    Received from [facility]    Food Insecurity     Within the past 30 days, I worried whether my food would run out before I got money to buy more. / En los últimos 30 días, me preocupó que la comida se podía acabar antes de tener dinero para compr...: Never true / Nunca     Within the past 30 days, the food that I bought just didn't last, and I didn't have money to get more. / En los últimos 30 días, La comida que compré no rindió lo suficiente, y no tenía dinero para...: Never true / Nunca    Received from [facility]    Housing Stability   [5]   Current Outpatient Medications   Medication Sig Dispense Refill    TRAMADOL HCL OR Take by mouth.      HYDROCHLOROTHIAZIDE OR Take by mouth.      hydroCHLOROthiazide 25 MG Oral Tab Take 1 tablet (25 mg total) by mouth daily.      Meloxicam 5 MG Oral Cap Take 5 mg by mouth.      PEG 3350-KCl-Na Bicarb-NaCl 420 g Oral Recon Soln Take 4,000 mL by mouth.      rosuvastatin 5 MG Oral Tab Take 1 tablet (5 mg total) by mouth nightly.      amLODIPine 10 MG Oral Tab Take 1 tablet (10 mg total) by mouth daily.      amLODIPine 10 MG Oral Tab Take 1 tablet (10 mg total) by mouth daily.      Acetaminophen-Codeine 300-15 MG Oral Tab Take 1 tablet by mouth.      amitriptyline 10 MG Oral Tab Take 1 tablet (10 mg total) by mouth nightly.      cetirizine  10 MG Oral Tab Take 1 tablet (10 mg total) by mouth daily.      CHLORTHALIDONE OR Take by mouth.      Cholecalciferol 50 MCG (2000 UT) Oral Tab Take 1 tablet (2,000 Units total) by mouth daily.      Ferrous Sulfate 325 (65 Fe) MG Oral Tab 1 tablet (325 mg total).      pregabalin 50 MG Oral Cap Take 1 capsule (50 mg total) by mouth 2 (two) times daily. 60 capsule 0    Fluticasone Propionate 50 MCG/ACT Nasal Suspension 1 spray by Each Nare route daily.      hydroCHLOROthiazide 25 MG Oral Tab Take 1 tablet (25 mg total) by mouth nightly.      ibuprofen 800 MG Oral Tab Take 1 tablet (800 mg total) by mouth every 8 (eight) hours.      losartan 25 MG Oral Tab Take 1 tablet (25 mg total) by mouth daily.      buPROPion  MG Oral Tablet 24 Hr Take 1 tablet (150 mg total) by mouth daily. (Patient not taking: Reported on 7/8/2025)      buPROPion  MG Oral Tablet 24 Hr Take 1 tablet (150 mg total) by mouth nightly. (Patient not taking: Reported on 7/8/2025)      chlorthalidone 25 MG Oral Tab Take 1 tablet (25 mg total) by mouth daily.      Cholecalciferol 1.25 MG (61400 UT) Oral Tab Take 1,250 mcg by mouth once a week.      EPINEPHrine 0.3 MG/0.3ML Injection Solution Auto-injector epinephrine 0.3 mg/0.3 mL injection, auto-injector   INJECT 1 PEN IN THE MUSCLE DAILY AS NEEDED      ergocalciferol 1.25 MG (07953 UT) Oral Cap Take 1 capsule (50,000 Units total) by mouth once a week.      famotidine 20 MG Oral Tab Take 1 tablet (20 mg total) by mouth nightly.      amLODIPine 2.5 MG Oral Tab Take by mouth.      celecoxib 200 MG Oral Cap Take 1 capsule (200 mg total) by mouth 2 (two) times daily. 60 capsule 0    Azelastine HCl 0.1 % Nasal Solution 2 sprays by Nasal route 2 (two) times daily. 1 Bottle 0    Loratadine-Pseudoephedrine ER 5-120 MG Oral Tablet 12 Hr Take 1 tablet by mouth every 12 (twelve) hours. 60 tablet 3    guaiFENesin-codeine (CHERATUSSIN AC) 100-10 MG/5ML Oral Solution Take 10 mL by mouth every 6 (six)  hours as needed for cough. 200 mL 0    oxyCODONE-acetaminophen 5-325 MG Oral Tab Take 1-2 tablets by mouth every 4 (four) hours as needed for Pain (new 4/14/16).      HYDROcodone-acetaminophen 5-325 MG Oral Tab Take 1 tablet by mouth every 6 (six) hours as needed for Pain.      Naproxen-Esomeprazole 500-20 MG Oral Tab EC Take by mouth.     [6] No Known Allergies

## 2025-07-10 ENCOUNTER — OFFICE VISIT (OUTPATIENT)
Dept: PHYSICAL THERAPY | Age: 50
End: 2025-07-10
Attending: STUDENT IN AN ORGANIZED HEALTH CARE EDUCATION/TRAINING PROGRAM
Payer: MEDICAID

## 2025-07-10 PROCEDURE — 97035 APP MDLTY 1+ULTRASOUND EA 15: CPT | Performed by: PHYSICAL THERAPIST

## 2025-07-10 PROCEDURE — 97110 THERAPEUTIC EXERCISES: CPT | Performed by: PHYSICAL THERAPIST

## 2025-07-10 PROCEDURE — 97014 ELECTRIC STIMULATION THERAPY: CPT | Performed by: PHYSICAL THERAPIST

## 2025-07-10 NOTE — PROGRESS NOTES
Patient: Herson Lewis (50 year old, female) Referring Provider:  Insurance:   Diagnosis: Tendinosis of right rotator cuff (M67.813) [employer]as[employer]   Date of Surgery: NA Next MD visit:  N/A   Precautions:  None No data recorded Referral Information:    Date of Evaluation: Req: 10, Auth: 10, Exp: 8/29/2025 06/30/25 POC Auth Visits:  10       Today's Date   7/10/2025    Subjective  Patient reports did ok since last visit but yesterday the shoulder was very painful.  Had been able to do the exercises other days but yesterday was bad. Did not do any exercises.        Pain: 4/10     Objective  see outpatient daily record.    AROM standing flex 108,  abduction 110.  Initial numbers at eval were flex 100, abd 120      Assessment   Still very limited due to pain in ability to move the arm or perform exercises and movement.  Added use of ultrasound also along with IFC and ice to work on pain relief.  Only very light massage was tolerated for 1 minute anterior upper arm. Was able to add wall slides in for home this session, prevoiusly too painful to attempt.     Goals (to be met in 12 visits)   Patient to be within 5-10 deg of L shoulder with all movements with minimal pain.   Patient independent with ongoing HEP  Patient to report pain levels at 1-2/10 or less with movement of R UE  Patient to be able to reach into cabinets with minimal pain  Patient to be able to reach behind her for dressing with minimal pain         Plan   Continue use of modalities, work to improve ROM, decrease pain, light strengthening as able.     Treatment Last 4 Visits  Treatment Day: 3       6/30/2025 7/2/2025 7/10/2025   UE Treatment   Therapeutic Exercise  3 minutes nu step level 3  Supine - PROM flex/abd to just below 90, ER /IR  Side ER x 10  Prone - hang arm off edge of bed x 1 minute  Prone rows x 8  Scapular retractions x 10  Standing single arm row YTB x 10  IFC and ice x 15 minutes.    3  minutes nu step level 3  Supine - PROM flex/abd to just below 90, ER /IR  Side ER 2 x 10  Prone - hang arm off edge of bed x 1 minute  Prone rows 2 x 5  Scapular retractions x 10  Standing single arm row YTB x 10- not completed  Table slide frwd x 10 scaption x 10    IFC and ice x 12 minutes.      Therapeutic Exercise Minutes 15 30 15   Ultrasound Minutes   8   Evaluation Minutes 30     E-Stim (Unattended) Minutes  15 15   Total Time Of Timed Procedures 15 30 23   Total Time Of Service-Based Procedures 30 15 15   Total Treatment Time 45 45 38   HEP Table slides frwd/scaption  Scapular retractions   AROM ER/IR movements  Rows YTB          HEP  Table slides frwd/scaption  Scapular retractions   AROM ER/IR movements  Rows YTB  Added wall slides    Charges   Ultrasound, Ex, Estim unattended

## 2025-07-18 ENCOUNTER — APPOINTMENT (OUTPATIENT)
Dept: PHYSICAL THERAPY | Age: 50
End: 2025-07-18
Attending: STUDENT IN AN ORGANIZED HEALTH CARE EDUCATION/TRAINING PROGRAM
Payer: MEDICAID

## 2025-07-25 ENCOUNTER — APPOINTMENT (OUTPATIENT)
Dept: PHYSICAL THERAPY | Age: 50
End: 2025-07-25
Attending: STUDENT IN AN ORGANIZED HEALTH CARE EDUCATION/TRAINING PROGRAM
Payer: MEDICAID

## 2025-07-28 NOTE — PROGRESS NOTES
[place] - ORTHOPEDICS  [address]  [phone]     NURSING INTAKE COMMENTS:   Chief Complaint   Patient presents with    Shoulder Pain     F/u for R shoulder pain. Had cortisone injection 5/15. Here to review MRI results. Pain today 4/10            The following individual(s) verbally consented to be recorded using ambient AI listening technology and understand that they can each withdraw their consent to this listening technology at any point by asking the clinician to turn off or pause the recording:    Patient name: Herson Lewis      HPI:   History of Present Illness  Herson Lewis is a 50 year old female with fibromyalgia who presents with right shoulder pain.    She has been experiencing persistent right shoulder pain, which has shown slight improvement over time. The pain is less severe than before, as she is no longer crying daily. An MRI was performed prior to this visit, and she reports tenderness in the right shoulder area.    Approximately six weeks ago, she received an injection that provided significant relief, reducing her need for tramadol. Since the injection, she has not taken tramadol and only occasionally uses Tylenol. She experiences difficulty with certain movements, such as zipping up clothing, due to limited extension.    She also mentions a left wrist issue, which she attributes to compensating for the right shoulder pain. The left wrist 'gives out' and causes strain, and she usually wears a band for support.    Her social history includes caring for her paralyzed father and dealing with her daughter's recent car accident, which has added stress to her life. Her daughter was involved in a car accident but fortunately walked away with only minor injuries.          Past Medical History[1]  Past Surgical History[2]  Current Medications[3]  Allergies[4]  Family History[5]    Social History     Occupational History    Not on file    Tobacco Use    Smoking status: Never    Smokeless tobacco: Never   Vaping Use    Vaping status: Never Used   Substance and Sexual Activity    Alcohol use: Yes     Comment: socially    Drug use: No    Sexual activity: Not on file        Review of Systems:  GENERAL: denies fevers, chills, night sweats, fatigue, unintentional weight loss/gain  SKIN: denies skin lesions, open sores, rash  HEENT:denies recent vision change, new nasal congestion,hearing loss, tinnitus, sore throat, headaches  RESPIRATORY: denies new shortness of breath, cough, asthma, wheezing  CARDIOVASCULAR: denies chest pain, leg cramps with exertion, palpitations, leg swelling  GI: denies abdominal pain, nausea, vomiting, diarrhea, constipation, hematochezia, worsening heartburn or stomach ulcers  : denies dysuria, hematuria, incontinence, increased frequency, urgency, difficulty urinating  MUSCULOSKELETAL: denies musculoskeletal complaints other than in HPI  NEURO: denies numbness, tingling, weakness, balance issues, dizziness, memory loss  PSYCHIATRIC: denies Hx of depression, anxiety, other psychiatric disorders  HEMATOLOGIC: denies blood clots, anemia, blood clotting disorders, blood transfusion  ENDOCRINE: denies autoimmune disease, thyroid issues, or diabetes  ALLERGY: denies asthma, seasonal allergies    Physical Examination:    LMP 05/06/2019     Physical Exam  MUSCULOSKELETAL: Right shoulder tender on palpation. Left wrist pain on resistance.    Constitutional: appears well hydrated, alert and responsive, no acute distress noted    Right shoulder exam    Forward elevation 100, abduction 100, ER 25, IR Lumbar spine, positive empty can, positive o'ronnell, neuro intact distally    Imaging:     Results  RADIOLOGY  Shoulder MRI: Questionable mild interstitial tear, moderate tendinosis at the junction of the supraspinatus and infraspinatus, no full-thickness tear, no atrophy, significant inflammation, AC joint inflammation      Imaging was  independently reviewed and interpreted by attending physician  No results found.     Labs:  Lab Results   Component Value Date    WBC 16.6 (H) 05/11/2019    HGB 11.2 (L) 05/11/2019    .0 05/11/2019      Lab Results   Component Value Date     (H) 05/11/2019    BUN 16 05/11/2019    CREATSERUM 1.12 (H) 05/11/2019    GFRNAA 60 05/11/2019    GFRAA 69 05/11/2019        Assessment and Plan:  Assessment & Plan  Right shoulder pain with tendinosis  Right shoulder pain with moderate tendinosis at the supraspinatus tendon junction. MRI shows no full-thickness tear or atrophy, no surgery needed. AC joint inflammation noted. Previous injection reduced tramadol use. Improved range of motion, some extension limitations persist.  - Administer shoulder injection every three months as needed.  - Initiate physical therapy this week.  - Prescribe Celebrex twice daily for one month.  - Follow up in six weeks to assess injection need.    Left wrist pain  Left wrist pain due to compensatory use from right shoulder pain. Reports wrist instability and strain during movements.  - Refer to Dr. Ugarte for further evaluation and management.      Diagnoses and all orders for this visit:    Tendinosis of right rotator cuff  -     Physical Therapy Referral - [place] Locations    Other orders  -     celecoxib 200 MG Oral Cap; Take 1 capsule (200 mg total) by mouth 2 (two) times daily.          Follow Up: No follow-ups on file.          Jasvir Benson MD Orthopedic Surgery / Sports Medicine Specialist  [facility]  [address] [place] [address]  [email or web address]    t: [phone] f: [phone]    This note was dictated using Dragon software.  While it was briefly proofread prior to completion, some grammatical, spelling, and word choice errors due to dictation may still occur.       [1]   Past Medical History:   Essential hypertension    Last Assessment & Plan:    Patient needs to make an appointment with  PCP for management of htn.    Fibromyalgia    Hyperlipidemia   [2]   Past Surgical History:  Procedure Laterality Date    Knee replacement surgery  2016    Knee scope,full synovect Left 4/14/2016    Procedure: ARTHROSCOPY LEFT KNEE W/ MEDIAL MENISCECTOMY;  Surgeon: Arsh Ugarte MD;  Location: [facility]    Knee scope,med/lat menisectomy Left 4/14/2016    Procedure: ARTHROSCOPY LEFT KNEE W/ MEDIAL MENISCECTOMY;  Surgeon: Arsh Ugarte MD;  Location: [facility]    Other Right     laparoscopic knee surgery   [3]   Current Outpatient Medications   Medication Sig Dispense Refill    amLODIPine 2.5 MG Oral Tab Take by mouth.      celecoxib 200 MG Oral Cap Take 1 capsule (200 mg total) by mouth 2 (two) times daily. 60 capsule 0    Azelastine HCl 0.1 % Nasal Solution 2 sprays by Nasal route 2 (two) times daily. 1 Bottle 0    Loratadine-Pseudoephedrine ER 5-120 MG Oral Tablet 12 Hr Take 1 tablet by mouth every 12 (twelve) hours. 60 tablet 3    Fluticasone Propionate 50 MCG/ACT Nasal Suspension 1 spray by Each Nare route daily.      guaiFENesin-codeine (CHERATUSSIN AC) 100-10 MG/5ML Oral Solution Take 10 mL by mouth every 6 (six) hours as needed for cough. 200 mL 0    oxyCODONE-acetaminophen 5-325 MG Oral Tab Take 1-2 tablets by mouth every 4 (four) hours as needed for Pain (new 4/14/16).      HYDROcodone-acetaminophen 5-325 MG Oral Tab Take 1 tablet by mouth every 6 (six) hours as needed for Pain.      Naproxen-Esomeprazole 500-20 MG Oral Tab EC Take by mouth.      TRAMADOL HCL OR Take by mouth.      HYDROCHLOROTHIAZIDE OR Take by mouth.      hydroCHLOROthiazide 25 MG Oral Tab Take 1 tablet (25 mg total) by mouth daily.      hydroCHLOROthiazide 25 MG Oral Tab Take 1 tablet (25 mg total) by mouth nightly.      ibuprofen 800 MG Oral Tab Take 1 tablet (800 mg total) by mouth every 8 (eight) hours.      losartan 25 MG Oral Tab Take 1 tablet (25 mg total) by mouth daily.      Meloxicam 5 MG Oral Cap Take 5 mg by  mouth.      PEG 3350-KCl-Na Bicarb-NaCl 420 g Oral Recon Soln Take 4,000 mL by mouth.      rosuvastatin 5 MG Oral Tab Take 1 tablet (5 mg total) by mouth nightly.      amLODIPine 10 MG Oral Tab Take 1 tablet (10 mg total) by mouth daily.      amLODIPine 10 MG Oral Tab Take 1 tablet (10 mg total) by mouth daily.      Acetaminophen-Codeine 300-15 MG Oral Tab Take 1 tablet by mouth.      amitriptyline 10 MG Oral Tab Take 1 tablet (10 mg total) by mouth nightly.      buPROPion  MG Oral Tablet 24 Hr Take 1 tablet (150 mg total) by mouth daily. (Patient not taking: Reported on 7/8/2025)      buPROPion  MG Oral Tablet 24 Hr Take 1 tablet (150 mg total) by mouth nightly. (Patient not taking: Reported on 7/8/2025)      cetirizine 10 MG Oral Tab Take 1 tablet (10 mg total) by mouth daily.      CHLORTHALIDONE OR Take by mouth.      chlorthalidone 25 MG Oral Tab Take 1 tablet (25 mg total) by mouth daily.      Cholecalciferol 1.25 MG (42007 UT) Oral Tab Take 1,250 mcg by mouth once a week.      Cholecalciferol 50 MCG (2000 UT) Oral Tab Take 1 tablet (2,000 Units total) by mouth daily.      EPINEPHrine 0.3 MG/0.3ML Injection Solution Auto-injector epinephrine 0.3 mg/0.3 mL injection, auto-injector   INJECT 1 PEN IN THE MUSCLE DAILY AS NEEDED      ergocalciferol 1.25 MG (10734 UT) Oral Cap Take 1 capsule (50,000 Units total) by mouth once a week.      famotidine 20 MG Oral Tab Take 1 tablet (20 mg total) by mouth nightly.      Ferrous Sulfate 325 (65 Fe) MG Oral Tab 1 tablet (325 mg total).      pregabalin 50 MG Oral Cap Take 1 capsule (50 mg total) by mouth 2 (two) times daily. 60 capsule 0   [4] No Known Allergies  [5]   Family History  Problem Relation Age of Onset    Prostate Cancer Maternal Grandfather     Prostate Cancer Maternal Uncle

## 2025-08-01 ENCOUNTER — APPOINTMENT (OUTPATIENT)
Dept: PHYSICAL THERAPY | Age: 50
End: 2025-08-01
Attending: STUDENT IN AN ORGANIZED HEALTH CARE EDUCATION/TRAINING PROGRAM

## 2025-08-03 DIAGNOSIS — M75.111 NONTRAUMATIC INCOMPLETE TEAR OF RIGHT ROTATOR CUFF: Primary | ICD-10-CM

## 2025-08-04 RX ORDER — CELECOXIB 200 MG/1
200 CAPSULE ORAL 2 TIMES DAILY
Qty: 60 CAPSULE | Refills: 2 | Status: SHIPPED | OUTPATIENT
Start: 2025-08-04

## 2025-08-06 ENCOUNTER — APPOINTMENT (OUTPATIENT)
Dept: PHYSICAL THERAPY | Age: 50
End: 2025-08-06
Attending: STUDENT IN AN ORGANIZED HEALTH CARE EDUCATION/TRAINING PROGRAM

## 2025-08-06 ENCOUNTER — TELEPHONE (OUTPATIENT)
Dept: PHYSICAL THERAPY | Facility: HOSPITAL | Age: 50
End: 2025-08-06

## 2025-08-15 ENCOUNTER — APPOINTMENT (OUTPATIENT)
Dept: PHYSICAL THERAPY | Age: 50
End: 2025-08-15
Attending: STUDENT IN AN ORGANIZED HEALTH CARE EDUCATION/TRAINING PROGRAM

## (undated) NOTE — LETTER
AUTHORIZATION FOR SURGICAL OPERATION OR OTHER PROCEDURE    1. I hereby authorize Dr. David Richardson, and [facility] staff assigned to my case to perform the following operation and/or procedure at the [facility] site:    _______________________________________________________________________________________________    Cortisone injection to Right Shoulder  _______________________________________________________________________________________________    2.  My physician has explained the nature and purpose of the operation or other procedure, possible alternative methods of treatment, the risks involved, and the possibility of complication to me.  I acknowledge that no guarantee has been made as to the result that may be obtained.  3.  I recognize that, during the course of this operation, or other procedure, unforseen conditions may necessitate additional or different procedure than those listed above.  I, therefore, further authorize and request that the above named physician, his/her physician assistants or designees perform such procedures as are, in his/her professional opinion, necessary and desirable.  4.  Any tissue or organs removed in the operation or other procedure may be disposed of by and at the discretion of the [facility] and [facility].  5.  I understand that in the event of a medical emergency, I will be transported by local paramedics to [facility] or other hospital emergency department.  6.  I certify that I have read and fully understand the above consent to operation and/or other procedure.    7.  I acknowledge that my physician has explained sedation/analgesia administration to me including the risks and benefits.  I consent to the administration of sedation/analgesia as may be necessary or desirable in the judgement of my physician.    Witness signature: ___________________________________________________ Date:   ______/______/_____                    Time:  ________ A.M.  P.M.       Patient Name:  ______________________________________________________  (please print)      Patient signature:  ___________________________________________________             Relationship to Patient:           []  Parent    Responsible person                          []  Spouse  In case of minor or                    [] Other  _____________   Incompetent name:  __________________________________________________                               (please print)      _____________      Responsible person  In case of minor or  Incompetent signature:  _______________________________________________    Statement of Physician  My signature below affirms that prior to the time of the procedure, I have explained to the patient and/or his/her guardian, the risks and benefits involved in the proposed treatment and any reasonable alternative to the proposed treatment.  I have also explained the risks and benefits involved in the refusal of the proposed treatment and have answered the patient's questions.                        Date:  ______/______/_______  Provider                      Signature:  __________________________________________________________       Time:  ___________ A.M    P.M.

## (undated) NOTE — ED AVS SNAPSHOT
[facility] Emergency Department    [address]     [place] [address]    Phone:  [phone]    Fax:  [phone]           Mallory Barbie   MRN: [id number]    Department:  [facility] Emergency Department   Date of Visit: and Class Registration line at [phone] or find a doctor online by visiting [email or web address].    IF THERE IS ANY CHANGE OR WORSENING OF YOUR CONDITION, CALL YOUR PRIMARY CARE PHYSICIAN AT ONCE OR RETURN IMMEDIATELY TO [address] [place] [address].     If

## (undated) NOTE — ED AVS SNAPSHOT
Saba Flynn   MRN: [id number]    Department:  [facility] Emergency Department   Date of Visit:  5/11/2019           Disclosure     Insurance plans vary and the physician(s) referred by the ER may not be covered by your plan.  [facility] CARE PHYSICIAN AT ONCE OR RETURN IMMEDIATELY TO THE EMERGENCY DEPARTMENT. If you have been prescribed any medication(s), please fill your prescription right away and begin taking the medication(s) as directed.   If you believe that any of the medications

## (undated) NOTE — ED AVS SNAPSHOT
[facility] Emergency Department    [address]     [place] [address]    Phone:  [phone]    Fax:  [phone]           Eddie Rosas   MRN: [id number]    Department:  [facility] Emergency Department   Date of Visit: Albuterol 1-2 puffs every 4-6 hours as needed for cough  followup with pmd in 2-3 days    Discharge References/Attachments     ACUTE BRONCHITIS, WHAT IS (ENGLISH)    BRONCHITIS, NO ANTIBIOTIC (ADULT) (ENGLISH)      Disclosure     Insurance plans vary and t IF THERE IS ANY CHANGE OR WORSENING OF YOUR CONDITION, CALL YOUR PRIMARY CARE PHYSICIAN AT ONCE OR RETURN IMMEDIATELY TO THE EMERGENCY DEPARTMENT.     If you have been prescribed any medication(s), please fill your prescription right away and begin taking t - If you don’t have insurance, [facility] has partnered with [employer] [facility] to help you get signed up for insurance coverage.   [employer] [employer] [facility] [employer] is a [employer]igator program that can help with your Affordable Care Act cover